# Patient Record
Sex: FEMALE | Race: WHITE | Employment: STUDENT | URBAN - METROPOLITAN AREA
[De-identification: names, ages, dates, MRNs, and addresses within clinical notes are randomized per-mention and may not be internally consistent; named-entity substitution may affect disease eponyms.]

---

## 2024-01-25 ENCOUNTER — OFFICE VISIT (OUTPATIENT)
Dept: FAMILY MEDICINE CLINIC | Facility: CLINIC | Age: 34
End: 2024-01-25
Payer: COMMERCIAL

## 2024-01-25 VITALS
HEIGHT: 65 IN | TEMPERATURE: 97 F | HEART RATE: 78 BPM | WEIGHT: 112 LBS | OXYGEN SATURATION: 98 % | SYSTOLIC BLOOD PRESSURE: 100 MMHG | RESPIRATION RATE: 16 BRPM | DIASTOLIC BLOOD PRESSURE: 70 MMHG | BODY MASS INDEX: 18.66 KG/M2

## 2024-01-25 DIAGNOSIS — L70.8 OTHER ACNE: ICD-10-CM

## 2024-01-25 DIAGNOSIS — K90.0 CELIAC DISEASE: ICD-10-CM

## 2024-01-25 DIAGNOSIS — Z91.89 AT RISK FOR WEIGHT LOSS: ICD-10-CM

## 2024-01-25 DIAGNOSIS — F41.9 ANXIETY: ICD-10-CM

## 2024-01-25 DIAGNOSIS — Z00.00 LABORATORY EXAM ORDERED AS PART OF ROUTINE GENERAL MEDICAL EXAMINATION: ICD-10-CM

## 2024-01-25 DIAGNOSIS — Z00.00 ENCOUNTER FOR MEDICAL EXAMINATION TO ESTABLISH CARE: Primary | ICD-10-CM

## 2024-01-25 DIAGNOSIS — Z97.5 IUD (INTRAUTERINE DEVICE) IN PLACE: ICD-10-CM

## 2024-01-25 PROBLEM — H93.13 TINNITUS OF BOTH EARS: Status: ACTIVE | Noted: 2020-12-21

## 2024-01-25 PROBLEM — R19.7 DIARRHEA: Status: ACTIVE | Noted: 2023-05-26

## 2024-01-25 PROBLEM — M67.471 GANGLION CYST OF RIGHT FOOT: Status: ACTIVE | Noted: 2020-12-21

## 2024-01-25 PROBLEM — R63.4 UNINTENDED WEIGHT LOSS: Status: RESOLVED | Noted: 2023-05-26 | Resolved: 2024-01-25

## 2024-01-25 PROBLEM — R63.4 UNINTENDED WEIGHT LOSS: Status: ACTIVE | Noted: 2023-05-26

## 2024-01-25 PROCEDURE — 3008F BODY MASS INDEX DOCD: CPT | Performed by: FAMILY MEDICINE

## 2024-01-25 PROCEDURE — 3078F DIAST BP <80 MM HG: CPT | Performed by: FAMILY MEDICINE

## 2024-01-25 PROCEDURE — 99204 OFFICE O/P NEW MOD 45 MIN: CPT | Performed by: FAMILY MEDICINE

## 2024-01-25 PROCEDURE — 3074F SYST BP LT 130 MM HG: CPT | Performed by: FAMILY MEDICINE

## 2024-01-25 RX ORDER — TRETINOIN 0.25 MG/G
GEL TOPICAL
Qty: 20 G | Refills: 1 | Status: SHIPPED | OUTPATIENT
Start: 2024-01-25

## 2024-01-25 NOTE — PATIENT INSTRUCTIONS
Treating Anxiety Disorders with Therapy  If you have an anxiety disorder, you should know it can be treated. Therapy (also called counseling) is often a helpful treatment for anxiety disorders. With therapy, a trained therapist helps you face and learn to manage your anxiety. Therapy can be short-term or long-term. It is based on your needs. In some cases, medicine may also be prescribed with therapy. It may take time before you notice how much therapy is helping. But stick with it. With therapy, you can feel better.  Cognitive behavioral therapy (CBT)  Cognitive behavioral therapy (CBT) teaches you to manage anxiety. It does this by helping you understand how you think and act when you’re anxious. Research has shown CBT to work very well for anxiety disorders. CBT includes homework and activities. These build your skills to cope with anxiety step by step. CBT can be done in a group or one-on-one. It often takes place for a set number of sessions. CBT has two main parts:  Cognitive therapy. This helps you identify the negative, irrational thoughts that occur with your anxiety. You’ll learn to replace these with more positive, realistic thoughts.  Behavioral therapy. This helps you change how you react to anxiety. You’ll learn coping skills and methods for relaxing to help you better deal with anxiety.    MENTAL HEALTH APPS-   [employer] CBT- Free Anxiety Focused--each you how to harness CBT to lower anxiety levels. It allows you to challenge the personal beliefs that may be holding you back from more peaceful living, and gives you clear tools to improve your overall wellbeing.    MoodKit $5.00- teaches concepts of Cognitive Behavioral Therapy    CBT Thought Diary- record and identify maladaptive thoughts.     HeadSpace- Great for Meditation can alleviate heightened anxiety, and Headspace aims to take the guesswork out of learning how to meditate, and will help you with a regular meditation practice.    Happify-  Happify is geared toward helping people lower stress and manage anxiety--all by helping you let go of habits that aren’t working for you and creating new ones

## 2024-01-25 NOTE — PROGRESS NOTES
Family Medicine Progress Note  Assessment & Plan:   Jacki Ng is a 33 year old female who is here for:     1. Encounter for medical examination to establish care  discussed PMH, PSH, Meds, Soc HX.   Last Annual: due   Prev Med:  up-to-date      2. At risk for weight loss-  prev underweight;  diet is limited due to her intolerance -and gluten.  Discussed possible benefit of seeing dietitian to come up with alternative foods that might be high-protein/high fat content and help with her goal of weight gain.  - DIETITIAN EDUCATION NON-DIABETES, DIET (INTERNAL)    3. Other acne- Acne with some scarring to the chin.  Discussed Acne Skin Care. Trial fo Tretinoin;  Discussed drying prop.   - Tretinoin 0.025 % External Gel; Apply thin layer to face every night or every other night.  Make sure to apply lotion after allowing to dry.  Dispense: 20 g; Refill: 1    4. 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  No SI/HI; GAD7 with mild anxiety.   - Discussed options. Already working o lifestyle mod.   - CBT apps discussed.   - Audiobooks/music to unwind.   - Declined Counseling at this time.      5. Celiac disease- has had some imprvement with lifestyle mod;    - Check labs for potential nutritional deficits   - DIETITIAN EDUCATION NON-DIABETES, DIET (INTERNAL)  - Vitamin B12 [E]; Future  - Folic Acid Serum [E]; Future  - Ferritin [E]; Future    6. Laboratory exam ordered as part of routine general medical examination  - CBC With Differential With Platelet; Future  - Comp Metabolic Panel (14); Future  - TSH W Reflex To Free T4; Future  - Lipid Panel; Future    7. Kyleena IUD 8/2023             Follow-Up: Return for Annual.      Mami Silva DO   01/25/24 1:58 PM     CC: Establish Care (Dx Celiac disease. )    Subjective:    History of  Present Illness:  History obtained from patient.     Jacki Ng is a 33 year old female who presents for Establish Care (Dx Celiac disease. )     Here to establish care prior PCM has since retired.  Patient was recently  in September.     MENTAL HEALTH- Feels she has been having a hard time winding down; \"resetting\" after work.  Has a very good relationship, very supportive, but she doesn't want to project stress/diffciutly with wind-down to her spouse.  Feels like she is in Constant state of fight or flight;  did have time period with more of Adjustment Dis where she was on meds, but this was after caring for a sick (now ) family member; Recently was  in 2023 and maneuvering everything w/o any immediate fmaily members was rough, which she didn't realize it would be initially. Since everything has calmed down she still feels this constant state of fight or flight;   Exercising; Sleep schedule is good;   Meds in the past-Lexapro- doesn't want to do meds now.    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     CELIAC- newer diagnosis, she had gotten to an unhealthy weight with the stress, Celiacs, Diarrhea;  Has been actively working to regain, but this is an effort given the Celiacs and Dairy intoll. \  Still with diarrhea here and there despite avoid gluten     ACNE SCARING- H/o acne and picking to the chin/face, scarring present. Not sure how to manage    DIET- healthy but has to monitor gluten/dairy.  Takes some MVN;  Ca    Kyleena IUD-23  Celiac  Anxiety/Depression  Pshx: WTE, Mole removal   All: gluten, dairy   Meds: as below  Fam hx: GBM-F, Endometrial Ca-m, Lymphoma-M   Soc hx: no tob/occ etoh/no illicits; , works in Higher Education/Grad school-Admin.   Ob/gyne: Patient's last menstrual period was 2024.. last pap: 2023, last mammogram: -,   ,   Colonoscopy: -      History/Other:   ROS-Per HPI     Problem List:  Patient Active Problem List   Diagnosis    Celiac disease    Tinnitus of both ears    Ganglion cyst of right foot    Diarrhea       Current Medications:  Current Outpatient Medications   Medication Sig Dispense Refill    Tretinoin 0.025 % External Gel Apply thin layer to face every night or every other night.  Make sure to apply lotion after allowing to dry. 20 g 1      Past Medical History:  Past Medical History:   Diagnosis Date    Anxiety     Unintended weight loss       Past Surgical History:  Past Surgical History:   Procedure Laterality Date    WISDOM TEETH REMOVED Bilateral       Family History:  Family History   Problem Relation Age of Onset    Cancer Father     Heart Disorder Father     Other (glioblastoma) Father     Musculo-skelatal Disorder Mother     Cancer Mother     Endometrial Cancer Mother     Other (lymphoma) Mother     Depression Maternal Grandmother       Social History:  Social History     Socioeconomic History    Marital status:    Tobacco Use    Smoking status: Never    Smokeless tobacco: Never   Vaping Use    Vaping Use: Never used   Substance and Sexual Activity    Alcohol use: Never    Drug use: Never    Sexual activity: Yes     Partners: Male     Birth control/protection: I.U.D.   Other Topics Concern    Caffeine Concern Yes     Comment: tea - 1-2 cups per day    Exercise Yes     Comment: 2- 4x in week- lifting and running    Seat Belt Yes       Allergies:  Allergies   Allergen Reactions    Dairy Products OTHER (SEE COMMENTS)     Digestive discomfort, subdermal swelling, joint pain    Gluten Meal NAUSEA AND VOMITING        Objective:    VITALS: /70   Pulse 78   Temp 97.3 °F (36.3 °C) (Temporal)   Resp 16   Ht 5' 5\" (1.651 m)   Wt 112 lb (50.8 kg)   LMP 2024   SpO2 98%   BMI 18.64 kg/m²      BP Readings from Last 3 Encounters:   24 100/70     Wt Readings from Last 3 Encounters:    01/25/24 112 lb (50.8 kg)         PHYSICAL EXAM  GEN: pleasant, well-appearing in NAD  SKIN: erythema and some scarring to the chin, inflam papule as well  HEENT:  no conjunctivitis or scleral icterus; mmm  PULM: breathing comfortably on room air  MSK: moving all extremities well, no weakness or joint tenderness  NEURO: CNs grossly intact, no focal weakness, alert and oriented   MENTAL STATUS EXAM  Appearance: groomed appropriately, makes good eye contact  Attitude: cooperative  Motor: No psychomotor agitation/depression   Speech: normal rate and rhythm   Mood: keyed up/on edge   Affect:  mood congruent  Form of Thought:  Fluid  Thought Content: No SI/HI   Perception: No hallucinations or delusions  Judgement and Insight- Intact      Approximately 50 minutes was spent: preparing to see the patient (reviewing prior tests, office notes, and consultant notes), personally obtaining a history, conducting a physical exam, counseling the patient on the plan of care, entering appropriate orders, and documenting clinical information in the electronic health record.         Mami Silva DO

## 2024-02-02 ENCOUNTER — OFFICE VISIT (OUTPATIENT)
Dept: FAMILY MEDICINE CLINIC | Facility: CLINIC | Age: 34
End: 2024-02-02
Payer: COMMERCIAL

## 2024-02-02 ENCOUNTER — LAB ENCOUNTER (OUTPATIENT)
Dept: LAB | Age: 34
End: 2024-02-02
Attending: FAMILY MEDICINE
Payer: COMMERCIAL

## 2024-02-02 VITALS
RESPIRATION RATE: 16 BRPM | DIASTOLIC BLOOD PRESSURE: 60 MMHG | WEIGHT: 112 LBS | SYSTOLIC BLOOD PRESSURE: 98 MMHG | TEMPERATURE: 98 F | HEART RATE: 82 BPM | BODY MASS INDEX: 18.66 KG/M2 | HEIGHT: 65 IN | OXYGEN SATURATION: 97 %

## 2024-02-02 DIAGNOSIS — K90.0 CELIAC DISEASE: ICD-10-CM

## 2024-02-02 DIAGNOSIS — Z97.5 IUD (INTRAUTERINE DEVICE) IN PLACE: ICD-10-CM

## 2024-02-02 DIAGNOSIS — L73.0 ACNE SCARRING: ICD-10-CM

## 2024-02-02 DIAGNOSIS — Z00.00 LABORATORY EXAM ORDERED AS PART OF ROUTINE GENERAL MEDICAL EXAMINATION: ICD-10-CM

## 2024-02-02 DIAGNOSIS — K59.1 FUNCTIONAL DIARRHEA: ICD-10-CM

## 2024-02-02 DIAGNOSIS — L70.0 ACNE VULGARIS: ICD-10-CM

## 2024-02-02 DIAGNOSIS — Z00.00 ENCOUNTER FOR GENERAL ADULT MEDICAL EXAMINATION WITHOUT ABNORMAL FINDINGS: Primary | ICD-10-CM

## 2024-02-02 PROBLEM — L90.5 ACNE SCARRING: Status: ACTIVE | Noted: 2024-02-02

## 2024-02-02 LAB
ALBUMIN SERPL-MCNC: 4.2 G/DL (ref 3.4–5)
ALBUMIN/GLOB SERPL: 1.4 {RATIO} (ref 1–2)
ALP LIVER SERPL-CCNC: 34 U/L
ALT SERPL-CCNC: 24 U/L
ANION GAP SERPL CALC-SCNC: 3 MMOL/L (ref 0–18)
AST SERPL-CCNC: 20 U/L (ref 15–37)
BASOPHILS # BLD AUTO: 0.05 X10(3) UL (ref 0–0.2)
BASOPHILS NFR BLD AUTO: 1.2 %
BILIRUB SERPL-MCNC: 0.6 MG/DL (ref 0.1–2)
BUN BLD-MCNC: 13 MG/DL (ref 9–23)
CALCIUM BLD-MCNC: 8.8 MG/DL (ref 8.5–10.1)
CHLORIDE SERPL-SCNC: 111 MMOL/L (ref 98–112)
CHOLEST SERPL-MCNC: 136 MG/DL (ref ?–200)
CO2 SERPL-SCNC: 27 MMOL/L (ref 21–32)
CREAT BLD-MCNC: 0.59 MG/DL
DEPRECATED HBV CORE AB SER IA-ACNC: 9.6 NG/ML
EGFRCR SERPLBLD CKD-EPI 2021: 120 ML/MIN/1.73M2 (ref 60–?)
EOSINOPHIL # BLD AUTO: 0.07 X10(3) UL (ref 0–0.7)
EOSINOPHIL NFR BLD AUTO: 1.6 %
ERYTHROCYTE [DISTWIDTH] IN BLOOD BY AUTOMATED COUNT: 13.7 %
FASTING PATIENT LIPID ANSWER: YES
FASTING STATUS PATIENT QL REPORTED: YES
FOLATE SERPL-MCNC: 21.4 NG/ML (ref 8.7–?)
GLOBULIN PLAS-MCNC: 3 G/DL (ref 2.8–4.4)
GLUCOSE BLD-MCNC: 91 MG/DL (ref 70–99)
HCT VFR BLD AUTO: 43.6 %
HDLC SERPL-MCNC: 75 MG/DL (ref 40–59)
HGB BLD-MCNC: 14.1 G/DL
IMM GRANULOCYTES # BLD AUTO: 0.01 X10(3) UL (ref 0–1)
IMM GRANULOCYTES NFR BLD: 0.2 %
LDLC SERPL CALC-MCNC: 53 MG/DL (ref ?–100)
LYMPHOCYTES # BLD AUTO: 1.46 X10(3) UL (ref 1–4)
LYMPHOCYTES NFR BLD AUTO: 33.7 %
MCH RBC QN AUTO: 31.1 PG (ref 26–34)
MCHC RBC AUTO-ENTMCNC: 32.3 G/DL (ref 31–37)
MCV RBC AUTO: 96 FL
MONOCYTES # BLD AUTO: 0.44 X10(3) UL (ref 0.1–1)
MONOCYTES NFR BLD AUTO: 10.2 %
NEUTROPHILS # BLD AUTO: 2.3 X10 (3) UL (ref 1.5–7.7)
NEUTROPHILS # BLD AUTO: 2.3 X10(3) UL (ref 1.5–7.7)
NEUTROPHILS NFR BLD AUTO: 53.1 %
NONHDLC SERPL-MCNC: 61 MG/DL (ref ?–130)
OSMOLALITY SERPL CALC.SUM OF ELEC: 292 MOSM/KG (ref 275–295)
PLATELET # BLD AUTO: 226 10(3)UL (ref 150–450)
POTASSIUM SERPL-SCNC: 4.4 MMOL/L (ref 3.5–5.1)
PROT SERPL-MCNC: 7.2 G/DL (ref 6.4–8.2)
RBC # BLD AUTO: 4.54 X10(6)UL
SODIUM SERPL-SCNC: 141 MMOL/L (ref 136–145)
TRIGL SERPL-MCNC: 25 MG/DL (ref 30–149)
TSI SER-ACNC: 1.54 MIU/ML (ref 0.36–3.74)
VIT B12 SERPL-MCNC: 398 PG/ML (ref 193–986)
VLDLC SERPL CALC-MCNC: 4 MG/DL (ref 0–30)
WBC # BLD AUTO: 4.3 X10(3) UL (ref 4–11)

## 2024-02-02 PROCEDURE — 85025 COMPLETE CBC W/AUTO DIFF WBC: CPT

## 2024-02-02 PROCEDURE — 82607 VITAMIN B-12: CPT

## 2024-02-02 PROCEDURE — 80053 COMPREHEN METABOLIC PANEL: CPT

## 2024-02-02 PROCEDURE — 3008F BODY MASS INDEX DOCD: CPT | Performed by: FAMILY MEDICINE

## 2024-02-02 PROCEDURE — 36415 COLL VENOUS BLD VENIPUNCTURE: CPT

## 2024-02-02 PROCEDURE — 82728 ASSAY OF FERRITIN: CPT

## 2024-02-02 PROCEDURE — 84443 ASSAY THYROID STIM HORMONE: CPT

## 2024-02-02 PROCEDURE — 3078F DIAST BP <80 MM HG: CPT | Performed by: FAMILY MEDICINE

## 2024-02-02 PROCEDURE — 82746 ASSAY OF FOLIC ACID SERUM: CPT

## 2024-02-02 PROCEDURE — 80061 LIPID PANEL: CPT

## 2024-02-02 PROCEDURE — 3074F SYST BP LT 130 MM HG: CPT | Performed by: FAMILY MEDICINE

## 2024-02-02 PROCEDURE — 99395 PREV VISIT EST AGE 18-39: CPT | Performed by: FAMILY MEDICINE

## 2024-02-02 RX ORDER — ASCORBIC ACID 500 MG
500 TABLET ORAL DAILY
COMMUNITY

## 2024-02-02 RX ORDER — MELATONIN
1000 DAILY
COMMUNITY

## 2024-02-02 RX ORDER — MULTIVITAMIN
1 TABLET ORAL DAILY
COMMUNITY

## 2024-02-02 RX ORDER — ASCORBIC ACID 500 MG
500 TABLET ORAL DAILY
COMMUNITY
End: 2024-02-02 | Stop reason: ALTCHOICE

## 2024-02-02 RX ORDER — CALCIUM CARBONATE 500 MG/1
1 TABLET, CHEWABLE ORAL DAILY
COMMUNITY

## 2024-02-02 NOTE — PROGRESS NOTES
Family Medicine Progress Note  Assessment & Plan:   Jacki Ng is a 33 year old female who is here for:       1. Encounter for general adult medical examination without abnormal findings  Wellness Exam done today and routine Preventative Care discussed as noted below.   -Pap smear: 01/20/2023 - none   -G&C testing: declined  -Contraception:  IUD   -Immunizations:  up-to-date   -Routine labs ordered.   -Healthy eating habits and regular exercise encouraged     2. Acne vulgaris  3. Acne scarring- pt was interested in [place] for acne scarring; discussed Derm and possible lase/microdermabrasion, referral as below   - DERM - EXTERNAL    4. Kyleena IUD 8/2023-    5. Celiac disease- chronic, actively managing.  Nutritional labs pending.      6. Functional diarrhea- still with AM diarrhea; discussed trying Metamucil/Fiber.          Follow-Up: Return for as needed.      Mami Silva DO        CC: Physical    Subjective:    History of Present Illness:  History obtained from patient.     Jacki Ng is a 33 year old female who presents for Physical     ANNUAL PHYSICAL  Menses: regular but still very light.   LMP: Patient's last menstrual period was 01/17/2024.  Concern for STI: Denies   Contraception:  IUD   Cervical Cancer Fhwypiobr-se-gz-date    PMH of Abnormal Pap: denies   Exercise: generally tries to be active--- Lifting 2/wk and some cardio.   Healthy eating habits:  healthy but has to monitor gluten/dairy.  Takes some MVN;  Ca  Tobacco: Denies   Immunizations: up-to-date        CELIAC- newer diagnosis, she had gotten to an unhealthy weight with the stress, Celiacs, Diarrhea;  Has been actively working to regain, but this is an effort given the Celiacs and Dairy intoll. Still with diarrhea here and there despite avoid gluten   ACNE SCARING- H/o acne and picking to the chin/face, scarring present. Not sure how to manage  Kyleena IUD-8/7/23  Anxiety/Depression-self managing   Pshx: WTE, Mole removal   All:  gluten, dairy   Meds: as below  Fam hx: GBM-F, Endometrial Ca-m, Lymphoma-M   Soc hx: no tob/occ etoh/no illicits; , works in Higher Education/Grad school-Admin.   Ob/gyne: Patient's last menstrual period was 2024.. last pap: 2023, last mammogram: -,  ,   Colonoscopy: -      History/Other:   ROS-Per HPI     Problem List:  Patient Active Problem List   Diagnosis    Celiac disease    Tinnitus of both ears    Ganglion cyst of right foot    Diarrhea    Kyleena IUD 2023    Acne scarring       Current Medications:  Current Outpatient Medications   Medication Sig Dispense Refill    cholecalciferol 25 MCG (1000 UT) Oral Tab Take 1 tablet (1,000 Units total) by mouth daily.      Multiple Vitamin (ONE-DAILY MULTI VITAMINS) Oral Tab Take 1 tablet by mouth daily.      Vitamin C 500 MG Oral Tab Take 1 tablet (500 mg total) by mouth daily.      calcium carbonate 500 MG Oral Chew Tab Chew 1 tablet (500 mg total) by mouth daily.      Tretinoin 0.025 % External Gel Apply thin layer to face every night or every other night.  Make sure to apply lotion after allowing to dry. (Patient not taking: Reported on 2024) 20 g 1      Past Medical History:  Past Medical History:   Diagnosis Date    Anxiety     Unintended weight loss       Past Surgical History:  Past Surgical History:   Procedure Laterality Date    WISDOM TEETH REMOVED Bilateral       Family History:  Family History   Problem Relation Age of Onset    Cancer Father     Heart Disorder Father     Other (glioblastoma) Father     Musculo-skelatal Disorder Mother     Cancer Mother     Endometrial Cancer Mother     Other (lymphoma) Mother     Depression Maternal Grandmother       Social History:  Social History     Socioeconomic History    Marital status:    Tobacco Use    Smoking status: Never    Smokeless tobacco: Never   Vaping Use    Vaping Use: Never used   Substance and Sexual Activity    Alcohol use: Never    Drug use: Never    Sexual  activity: Yes     Partners: Male     Birth control/protection: I.U.D.   Other Topics Concern    Caffeine Concern Yes     Comment: tea - 1-2 cups per day    Exercise Yes     Comment: 2- 4x in week- lifting and running    Seat Belt Yes       Allergies:  Allergies   Allergen Reactions    Dairy Products OTHER (SEE COMMENTS)     Digestive discomfort, subdermal swelling, joint pain    Gluten Meal NAUSEA AND VOMITING        Objective:    VITALS: BP 98/60   Pulse 82   Temp 97.8 °F (36.6 °C) (Temporal)   Resp 16   Ht 5' 5\" (1.651 m)   Wt 112 lb (50.8 kg)   LMP 01/17/2024   SpO2 97%   BMI 18.64 kg/m²      BP Readings from Last 3 Encounters:   02/02/24 98/60   01/25/24 100/70     Wt Readings from Last 3 Encounters:   02/02/24 112 lb (50.8 kg)   01/25/24 112 lb (50.8 kg)         PHYSICAL EXAM  GEN: pleasant, well-appearing in NAD, AOX3  SKIN: inflammatory papules and scarring to the face.   HEENT: PERRL, EOMI, moist mucous membranes, oropharynx clear, TM clear, nares patent, no Thyromegaly or nodule.   CV: RRR, no murmurs or abnl heart sounds   PULM: Clear to auscultation, No wheezes, rales, rhonchi.  Non-labored breathing.  ABD: Soft, non-tender, non-distended, + BS, no rigidity/guarding  EXT:  Warm, well perfused, no lower extremity edema  NEURO: CNs grossly intact, no focal weakness  MSK: moves all 4 extremities without difficulty  PSYCH: mood and affect are appropriate       Mami Silva DO

## 2024-02-02 NOTE — PATIENT INSTRUCTIONS
Screening Guidelines, Women Ages 18 to 39  Screening tests and health counseling are an important part of managing your health. A screening test is done to find possible disorders or diseases in people who don't have any symptoms. The goal is to find a disease early so changes can be made and you can be watched more closely to reduce the risk of disease, or to detect it early enough to treat it most effectively. Screening tests but are used to determine if more testing is needed. Health counseling is essential, too. Below are guidelines for these, for women ages 18 to 39. Talk with your healthcare provider to make sure you’re up-to-date on what you need.  We understand gender is a spectrum. We may use gendered terms to talk about anatomy and health risk. Please use this sheet in a way that works best for you and your provider as you talk about your care.  Screening Who needs it How often   Alcohol misuse All women in this age group At routine exams   Blood pressure All women in this age group Yearly checkup if your blood pressure is normal  Normal blood pressure is less than 120/80 mm Hg  If your blood pressure reading is higher than normal, follow the advice of your healthcare provider   Breast cancer All women in this age group should talk with their healthcare providers about the need for clinical breast exams (CBE)1 Clinical breast exam every 3 years   Cervical cancer Women ages 21 and older Women between ages 21 and 29 should have a Pap test every 3 years; women between ages 30 and 65 are advised to have a Pap test plus an HPV test every 5 years   Chlamydia Sexually active women, including those who are pregnant and who are:  24 years and younger  25 years and older at increased risk for infection    At routine yearly exams  If pregnant, during early prenatal care visit. Repeat in 3rd trimester for women at increased risk.   Depression All women in this age group At routine exams   Diabetes mellitus, type 2  Adults with no symptoms who are overweight or obese and have 1 or more other risk factors for diabetes At least every 3 years. Also, testing for diabetes during pregnancy after the 24th week.    Gonorrhea Sexually active women, including those who are pregnant and who are:  24 years and younger  25 years and older at increased risk for infection    At routine yearly exams  Test during pregnancy if 25 years or younger or if living in an area where gonorrhea is common.   Hepatitis C Anyone at increased risk At routine exams   HIV All women At routine exams  and in all pregnant people   Obesity All women in this age group At routine exams   Syphilis Women at increased risk for infection should talk with their healthcare provider At routine exams   Tuberculosis Women at increased risk for infection should talk with their healthcare provider Ask your healthcare provider   Vision All women in this age group At least 1 complete exam in your 20s, and 2 in your 30s   Health counseling Who needs it How often   BRCA gene mutation testing for breast and ovarian cancer susceptibility Women with increased risk for having gene mutation When your risk is known   Breast cancer and chemoprevention Women at high risk for breast cancer When your risk is known   Diet and exercise Women who are overweight or obese When diagnosed, and then at routine exams   Domestic violence All women in this age group Every visit   Sexually transmitted infection prevention Women who are sexually active At routine exams   Skin cancer Prevention of skin cancer in fair-skinned adults At routine exams   Use of tobacco and the health effects it can cause All women in this age group Every visit   According to the ACS, women ages 20 to 39 years should have a clinical breast exam (CBE) as part of their routine health exam every 3 years. Breast self-exams are an option for women starting in their 20s. But the U.S. Preventive Services Task Force (USPSTF) does not  recommend CBE.  Those who are 18 years old and not up-to-date on their childhood vaccines should get all appropriate catch-up vaccines recommended by the CDC.  The USPSTF recommends that all people ages 15 to 65 years be screened for HIV and those younger or older people at increased risk. The CDC recommends that everyone between the ages of 13 and 64 get tested for HIV at least once as part of routine health care.  [employer] last reviewed this educational content on 6/1/2021 © 2000-2022 [employer]. All rights reserved. This information is not intended as a substitute for professional medical care. Always follow your healthcare professional's instructions.

## 2025-03-12 ENCOUNTER — LAB ENCOUNTER (OUTPATIENT)
Dept: LAB | Age: 35
End: 2025-03-12
Attending: FAMILY MEDICINE
Payer: COMMERCIAL

## 2025-03-12 ENCOUNTER — OFFICE VISIT (OUTPATIENT)
Dept: FAMILY MEDICINE CLINIC | Facility: CLINIC | Age: 35
End: 2025-03-12
Payer: COMMERCIAL

## 2025-03-12 VITALS
HEIGHT: 65 IN | DIASTOLIC BLOOD PRESSURE: 70 MMHG | TEMPERATURE: 99 F | RESPIRATION RATE: 16 BRPM | BODY MASS INDEX: 19.83 KG/M2 | SYSTOLIC BLOOD PRESSURE: 96 MMHG | OXYGEN SATURATION: 98 % | HEART RATE: 90 BPM | WEIGHT: 119 LBS

## 2025-03-12 DIAGNOSIS — Z30.432 ENCOUNTER FOR IUD REMOVAL: ICD-10-CM

## 2025-03-12 DIAGNOSIS — Z31.69 PRE-CONCEPTION COUNSELING: ICD-10-CM

## 2025-03-12 DIAGNOSIS — K90.0 CELIAC DISEASE (HCC): ICD-10-CM

## 2025-03-12 DIAGNOSIS — Z00.00 LABORATORY EXAM ORDERED AS PART OF ROUTINE GENERAL MEDICAL EXAMINATION: ICD-10-CM

## 2025-03-12 DIAGNOSIS — Z00.01 ENCOUNTER FOR GENERAL ADULT MEDICAL EXAMINATION WITH ABNORMAL FINDINGS: Primary | ICD-10-CM

## 2025-03-12 LAB
ALBUMIN SERPL-MCNC: 4.6 G/DL (ref 3.2–4.8)
ALBUMIN/GLOB SERPL: 1.7 {RATIO} (ref 1–2)
ALP LIVER SERPL-CCNC: 35 U/L
ALT SERPL-CCNC: 16 U/L
ANION GAP SERPL CALC-SCNC: 8 MMOL/L (ref 0–18)
AST SERPL-CCNC: 26 U/L (ref ?–34)
BASOPHILS # BLD AUTO: 0.01 X10(3) UL (ref 0–0.2)
BASOPHILS NFR BLD AUTO: 0.2 %
BILIRUB SERPL-MCNC: 0.5 MG/DL (ref 0.3–1.2)
BUN BLD-MCNC: 11 MG/DL (ref 9–23)
CALCIUM BLD-MCNC: 8.9 MG/DL (ref 8.7–10.6)
CHLORIDE SERPL-SCNC: 102 MMOL/L (ref 98–112)
CHOLEST SERPL-MCNC: 125 MG/DL (ref ?–200)
CO2 SERPL-SCNC: 24 MMOL/L (ref 21–32)
CREAT BLD-MCNC: 0.77 MG/DL
DEPRECATED HBV CORE AB SER IA-ACNC: 51 NG/ML
EGFRCR SERPLBLD CKD-EPI 2021: 102 ML/MIN/1.73M2 (ref 60–?)
EOSINOPHIL # BLD AUTO: 0 X10(3) UL (ref 0–0.7)
EOSINOPHIL NFR BLD AUTO: 0 %
ERYTHROCYTE [DISTWIDTH] IN BLOOD BY AUTOMATED COUNT: 13.3 %
FASTING PATIENT LIPID ANSWER: YES
FASTING STATUS PATIENT QL REPORTED: YES
FOLATE SERPL-MCNC: 36.2 NG/ML (ref 5.4–?)
GLOBULIN PLAS-MCNC: 2.7 G/DL (ref 2–3.5)
GLUCOSE BLD-MCNC: 93 MG/DL (ref 70–99)
HCT VFR BLD AUTO: 39.7 %
HDLC SERPL-MCNC: 58 MG/DL (ref 40–59)
HGB BLD-MCNC: 13.3 G/DL
IMM GRANULOCYTES # BLD AUTO: 0.01 X10(3) UL (ref 0–1)
IMM GRANULOCYTES NFR BLD: 0.2 %
LDLC SERPL CALC-MCNC: 57 MG/DL (ref ?–100)
LYMPHOCYTES # BLD AUTO: 0.31 X10(3) UL (ref 1–4)
LYMPHOCYTES NFR BLD AUTO: 6.4 %
MCH RBC QN AUTO: 31.5 PG (ref 26–34)
MCHC RBC AUTO-ENTMCNC: 33.5 G/DL (ref 31–37)
MCV RBC AUTO: 94.1 FL
MONOCYTES # BLD AUTO: 0.5 X10(3) UL (ref 0.1–1)
MONOCYTES NFR BLD AUTO: 10.3 %
NEUTROPHILS # BLD AUTO: 4.04 X10 (3) UL (ref 1.5–7.7)
NEUTROPHILS # BLD AUTO: 4.04 X10(3) UL (ref 1.5–7.7)
NEUTROPHILS NFR BLD AUTO: 82.9 %
NONHDLC SERPL-MCNC: 67 MG/DL (ref ?–130)
OSMOLALITY SERPL CALC.SUM OF ELEC: 277 MOSM/KG (ref 275–295)
PLATELET # BLD AUTO: 183 10(3)UL (ref 150–450)
POTASSIUM SERPL-SCNC: 3.5 MMOL/L (ref 3.5–5.1)
PROT SERPL-MCNC: 7.3 G/DL (ref 5.7–8.2)
RBC # BLD AUTO: 4.22 X10(6)UL
SODIUM SERPL-SCNC: 134 MMOL/L (ref 136–145)
T3FREE SERPL-MCNC: 2.29 PG/ML (ref 2.4–4.2)
T4 FREE SERPL-MCNC: 1.1 NG/DL (ref 0.8–1.7)
TRIGL SERPL-MCNC: 41 MG/DL (ref 30–149)
TSI SER-ACNC: 0.41 UIU/ML (ref 0.55–4.78)
VIT B12 SERPL-MCNC: 448 PG/ML (ref 211–911)
VIT D+METAB SERPL-MCNC: 44.8 NG/ML (ref 30–100)
VLDLC SERPL CALC-MCNC: 6 MG/DL (ref 0–30)
WBC # BLD AUTO: 4.9 X10(3) UL (ref 4–11)

## 2025-03-12 PROCEDURE — 82306 VITAMIN D 25 HYDROXY: CPT

## 2025-03-12 PROCEDURE — 80053 COMPREHEN METABOLIC PANEL: CPT

## 2025-03-12 PROCEDURE — 84443 ASSAY THYROID STIM HORMONE: CPT

## 2025-03-12 PROCEDURE — 82728 ASSAY OF FERRITIN: CPT

## 2025-03-12 PROCEDURE — 84481 FREE ASSAY (FT-3): CPT

## 2025-03-12 PROCEDURE — 82607 VITAMIN B-12: CPT

## 2025-03-12 PROCEDURE — 36415 COLL VENOUS BLD VENIPUNCTURE: CPT

## 2025-03-12 PROCEDURE — 85025 COMPLETE CBC W/AUTO DIFF WBC: CPT

## 2025-03-12 PROCEDURE — 82746 ASSAY OF FOLIC ACID SERUM: CPT

## 2025-03-12 PROCEDURE — 80061 LIPID PANEL: CPT

## 2025-03-12 PROCEDURE — 84439 ASSAY OF FREE THYROXINE: CPT

## 2025-03-12 NOTE — PROGRESS NOTES
Family Medicine Progress Note  Assessment & Plan:   Follow-Up: Return for as needed.     Assessment & Plan  Encounter for general adult medical examination with abnormal findings  Wellness Exam done today and routine Preventative Care discussed as noted below.   -Pap smear: 01/20/2023   up-to-date    -G&C testing: declined  -Contraception:  none TTC  -Immunizations:  up-to-date   -Routine labs ordered.   -Healthy eating habits and regular exercise encouraged   Orders:    CBC With Differential With Platelet; Future; Expected date: 03/12/2025    Comp Metabolic Panel (14); Future; Expected date: 03/12/2025    TSH W Reflex To Free T4; Future; Expected date: 03/12/2025    Lipid Panel; Future; Expected date: 03/12/2025    Celiac disease (HCC)  Actively managing;  eval for nutritional def.    Orders:    Ferritin; Future; Expected date: 03/12/2025    Vitamin B12; Future; Expected date: 03/12/2025    Folic Acid Serum (Folate); Future; Expected date: 03/12/2025    Vitamin D; Future; Expected date: 03/12/2025    Pre-conception counseling  -  34 year old YOF with hopes of becoming pregnant in the next 6 mos.    - Recommended PNV   - Discussed 3 mos interval menses to regulate after d/c of contraception  - BMI is appropriate   - Discussed routine exercise and healthy eating.   - Discussed ideal timing for intercourse,   - No tobacco and alcohol use.  Feels safe at home.    - Not on any teratogenic RX therapy        Encounter for IUD removal            Subjective:    CC: Physical and IUD (Would like to talk about removal of IUD. )  History of Present Illness:  History obtained from patient.     Jacki Ng is a 34 year old female who presents for Physical and IUD (Would like to talk about removal of IUD. )     ANNUAL PHYSICAL  Menses: regular but still very light.   LMP: Patient's last menstrual period was 02/20/2025.  Concern for STI: Denies   Contraception:  IUD - but would like to have removed, they are ready to start  trying!   Cervical Cancer [id number]-na-ok-date    PMH of Abnormal Pap: denies   Exercise: generally tries to be active--- Lifting 2/wk and some cardio.   Healthy eating habits:  healthy but has to monitor gluten/dairy.  Takes some MVN;  Ca  Tobacco: Denies   Immunizations: up-to-date        CELIAC- avoids Gluten   ACNE SCARING- H/o acne and picking to the chin/face, scarring present. Not sure how to manag  Anxiety/Depression-self managing   Pshx: WTE, Mole removal   All: gluten, dairy   Meds: as below  Fam hx: GBM-F, Endometrial Ca-m, Lymphoma-M   Soc hx: no tob/occ etoh/no illicits; , works in Higher Education/Grad school-Admin.   Ob/gyne: Patient's last menstrual period was 2025.. last pap: 2023, last mammogram: -,  ,   Colonoscopy: -      History/Other:   ROS-Per HPI     Problem List:  Patient Active Problem List   Diagnosis    Celiac disease (HCC)    Tinnitus of both ears    Ganglion cyst of right foot    Diarrhea    Kyleena IUD 2023    Acne scarring       Current Medications:  Current Outpatient Medications   Medication Sig Dispense Refill    Multiple Vitamin (ONE-DAILY MULTI VITAMINS) Oral Tab Take 1 tablet by mouth daily.      calcium carbonate 500 MG Oral Chew Tab Chew 1 tablet (500 mg total) by mouth daily.      cholecalciferol 25 MCG (1000 UT) Oral Tab Take 1 tablet (1,000 Units total) by mouth daily. (Patient not taking: Reported on 3/12/2025)      Vitamin C 500 MG Oral Tab Take 1 tablet (500 mg total) by mouth daily. (Patient not taking: Reported on 3/12/2025)      Tretinoin 0.025 % External Gel Apply thin layer to face every night or every other night.  Make sure to apply lotion after allowing to dry. (Patient not taking: Reported on 2024) 20 g 1      Past Medical History:  Past Medical History:    Anxiety    Unintended weight loss      Past Surgical History:  Past Surgical History:   Procedure Laterality Date    Other surgical history  wisdom teeth, benign mole    [place]  teeth removed Bilateral       Family History:  Family History   Problem Relation Age of Onset    Cancer Father         glioblastoma    Heart Disorder Father     Other (glioblastoma) Father     Musculo-skelatal Disorder Mother     Cancer Mother         endometrial and a lymphoma    Endometrial Cancer Mother     Other (lymphoma) Mother     Depression Maternal Grandmother     Stroke Maternal Grandmother       Social History:  Social History     Socioeconomic History    Marital status:    Tobacco Use    Smoking status: Never    Smokeless tobacco: Never   Vaping Use    Vaping status: Never Used   Substance and Sexual Activity    Alcohol use: Never    Drug use: Never    Sexual activity: Yes     Partners: Male     Birth control/protection: I.U.D.   Other Topics Concern    Caffeine Concern Yes    Special Diet Yes     Comment: celiac positive (fully gluten free)    Exercise Yes    Seat Belt Yes     Social Drivers of Health     Food Insecurity: No Food Insecurity (3/12/2025)    NCSS - Food Insecurity     Worried About Running Out of Food in the Last Year: No     Ran Out of Food in the Last Year: No   Transportation Needs: No Transportation Needs (3/12/2025)    NCSS - Transportation     Lack of Transportation: No   Housing Stability: Not At Risk (3/12/2025)    NCSS - Housing/Utilities     Has Housing: Yes     Worried About Losing Housing: No     Unable to Get Utilities: No       Allergies:  Allergies   Allergen Reactions    Dairy Products OTHER (SEE COMMENTS)     Digestive discomfort, subdermal swelling, joint pain    Gluten Meal NAUSEA AND VOMITING        Objective:    VITALS: BP 96/70   Pulse 90   Temp 98.9 °F (37.2 °C) (Temporal)   Resp 16   Ht 5' 5\" (1.651 m)   Wt 119 lb (54 kg)   LMP 02/20/2025   SpO2 98%   BMI 19.80 kg/m²      BP Readings from Last 3 Encounters:   03/12/25 96/70   02/02/24 98/60   01/25/24 100/70     Wt Readings from Last 3 Encounters:   03/12/25 119 lb (54 kg)   02/02/24 112 lb (50.8 kg)    01/25/24 112 lb (50.8 kg)         PHYSICAL EXAM  GEN: pleasant, well-appearing in NAD, AOX3  SKIN: inflammatory papules and scarring to the face.   HEENT: PERRL, EOMI, moist mucous membranes, oropharynx clear, TM clear, nares patent, no Thyromegaly or nodule.   CV: RRR, no murmurs or abnl heart sounds   PULM: Clear to auscultation, No wheezes, rales, rhonchi.  Non-labored breathing.  ABD: Soft, non-tender, non-distended, + BS, no rigidity/guarding  EXT:  Warm, well perfused, no lower extremity edema  NEURO: CNs grossly intact, no focal weakness  MSK: moves all 4 extremities without difficulty  PSYCH: mood and affect are appropriate  BREAST: clinical breast exam done,  Non-tender, no palpable lumps,  No skin changes, no nipple discharge.    PELVIC EXAM: Chaperone offered and declined.   Vulva: no masses, tenderness or lesions,   Vagina: normal mucosa and rugae, no lesions  Cervix: IUD strings visualized ;  physiologic discharge, no cervical lesions or presence of irritation/inflammation      Family Medicine Procedure Note- IUD Removal   PRE-OP DIAGNOSIS: IUD   POST-OP DIAGNOSIS: IUD Removed  PROCEDURE: IUD Removal   PHYSICIAN: Mami Silva DO   ASSISTANT(S): none   FINDINGS: IUD strings in place   ANESTHESIA: None   INDICATION: No longer needed   DESCRIPTION:  Before the procedure, we discussed the nature of the procedure along with the risks  benefits, and alternatives. The patient carefully considered all of this information and chose to proceed with IUD removal . All of her questions were answered.  Verbal consent  A safety pause was performed to verify correct patient, procedure, equipment, support staff and procedural site.   The speculum was inserted and the cervix was identified.  The IUD strings were identified and grasped with a Laila clamp and the IUD was removed.  The patient tolerated the procedure well.  No complications.  ESTMATED BLOOD LOSS:<5ml  COMPLICATIONS: None   CONDITION: Good    DISPOSITION: Jesus Silva DO    03/12/25 3:22 PM    NOTE TO PATIENT: The 21st Century Cures Act makes clinical notes like these available to patients in the interest of transparency. Clinical notes are medical documents used by physicians and care providers to communicate with each other. These documents include medical language and terminology, abbreviations, and treatment information that may sound technical and at times possibly unfamiliar. In addition, at times, the verbiage may appear blunt or direct. These documents are one tool providers use to communicate relevant information and clinical opinions of the care providers in a way that allows common understanding of the clinical context.

## 2025-03-12 NOTE — ASSESSMENT & PLAN NOTE
Actively managing;  eval for nutritional def.    Orders:    Ferritin; Future; Expected date: 03/12/2025    Vitamin B12; Future; Expected date: 03/12/2025    Folic Acid Serum (Folate); Future; Expected date: 03/12/2025    Vitamin D; Future; Expected date: 03/12/2025

## 2025-03-12 NOTE — PROGRESS NOTES
The following individual(s) verbally consented to be NOT recorded using ambient AI listening technology .    Patient name: Jacki Ng   Additional names:

## 2025-03-21 DIAGNOSIS — R94.6 ABNORMAL THYROID FUNCTION TEST: Primary | ICD-10-CM

## 2025-04-23 ENCOUNTER — LABORATORY ENCOUNTER (OUTPATIENT)
Dept: LAB | Age: 35
End: 2025-04-23
Attending: FAMILY MEDICINE
Payer: COMMERCIAL

## 2025-04-23 DIAGNOSIS — R94.6 ABNORMAL THYROID FUNCTION TEST: ICD-10-CM

## 2025-04-23 LAB
T3FREE SERPL-MCNC: 3.41 PG/ML (ref 2.4–4.2)
T4 FREE SERPL-MCNC: 1.2 NG/DL (ref 0.8–1.7)
THYROGLOB SERPL-MCNC: 26 U/ML (ref ?–60)
THYROPEROXIDASE AB SERPL-ACNC: <28 U/ML (ref ?–60)
TSI SER-ACNC: 1.42 UIU/ML (ref 0.55–4.78)

## 2025-04-23 PROCEDURE — 86376 MICROSOMAL ANTIBODY EACH: CPT

## 2025-04-23 PROCEDURE — 86800 THYROGLOBULIN ANTIBODY: CPT

## 2025-04-23 PROCEDURE — 84445 ASSAY OF TSI GLOBULIN: CPT

## 2025-04-23 PROCEDURE — 36415 COLL VENOUS BLD VENIPUNCTURE: CPT

## 2025-04-23 PROCEDURE — 84439 ASSAY OF FREE THYROXINE: CPT

## 2025-04-23 PROCEDURE — 84481 FREE ASSAY (FT-3): CPT

## 2025-04-23 PROCEDURE — 84443 ASSAY THYROID STIM HORMONE: CPT

## 2025-04-25 LAB — THY STIM IMMUNO: <0.1 IU/L

## 2025-06-16 ENCOUNTER — OFFICE VISIT (OUTPATIENT)
Dept: FAMILY MEDICINE CLINIC | Facility: CLINIC | Age: 35
End: 2025-06-16
Payer: COMMERCIAL

## 2025-06-16 VITALS
BODY MASS INDEX: 20.83 KG/M2 | HEART RATE: 84 BPM | HEIGHT: 65 IN | TEMPERATURE: 97 F | WEIGHT: 125 LBS | OXYGEN SATURATION: 98 % | RESPIRATION RATE: 18 BRPM | DIASTOLIC BLOOD PRESSURE: 70 MMHG | SYSTOLIC BLOOD PRESSURE: 110 MMHG

## 2025-06-16 DIAGNOSIS — Z3A.01 LESS THAN 8 WEEKS GESTATION OF PREGNANCY (HCC): Primary | ICD-10-CM

## 2025-06-16 PROBLEM — Z97.5 IUD (INTRAUTERINE DEVICE) IN PLACE: Status: RESOLVED | Noted: 2024-02-02 | Resolved: 2025-06-16

## 2025-06-16 RX ORDER — CHOLECALCIFEROL (VITAMIN D3) 25 MCG
1 TABLET,CHEWABLE ORAL DAILY
COMMUNITY

## 2025-06-16 NOTE — PROGRESS NOTES
Family Medicine Progress Note  Assessment & Plan:   Follow-Up: Return for as needed.        Assessment & Plan  Less than 8 weeks gestation of pregnancy (HCC)  Pt is a  presenting with recent positive home UPT, Patient's last menstrual period was 05/10/2025 (exact date). Based on LMP she would be about 5w+2d with RIYA .    - Congratulated her!   - Discussed 1st trimeser expectations.   - Discussed foods/Meds to avoid.   - Engage in regular exercise, adjusting as needed.  - Midwife referral placed. As requested.   - Check insurance for chiropractic care coverage.  Orders:    Midwife Referral - Francy ([place])       Subjective:    CC: Follow - Up  History of Present Illness:  History obtained from patient.     Jacki Ng is a 34 year old female who presents for Follow - Up     History of Present Illness  Jacki Ng is a 34 year old female who presents for a prenatal visit.    She is attending her first prenatal visit after confirming her pregnancy. Her last menstrual cycle was on May 10, 2025, and she has been taking prenatal vitamins since 2025. She experiences some insomnia and increased fatigue but otherwise feels well. No nausea or food aversions have been noted.    She inquires about dietary restrictions during pregnancy, specifically regarding herbal teas, lunch meats, and caffeine. She discusses her exercise routine, which includes rollerblading, and is considering adjustments due to balance concerns as her pregnancy progresses.    She expresses interest in midwifery care and is exploring options for a midwife. She is considering a chiropractor for potential back pain as her pregnancy progresses.    She asks about lifestyle considerations during pregnancy, including the safety of household painting and sex during pregnancy.         CELIAC- avoids Gluten   ACNE SCARING- H/o acne and picking to the chin/face, scarring present. Not sure how to manag  Anxiety/Depression-self managing    Pshx: WTE, Mole removal   All: gluten, dairy   Meds: as below  Fam hx: GBM-F, Endometrial Ca-m, Lymphoma-M   Soc hx:  , works in Higher Education/Grad school-Admin.   Ob/gyne: Patient's last menstrual period was 05/10/2025 (exact date).. last pap: 2023, last mammogram: -,  ,   Colonoscopy: -      History/Other:   ROS-Per HPI     Problem List:  Patient Active Problem List   Diagnosis    Celiac disease (HCC)    Tinnitus of both ears    Ganglion cyst of right foot    Diarrhea    Acne scarring       Current Medications:  Current Outpatient Medications   Medication Sig Dispense Refill    prenatal vitamin with DHA 27-0.8-228 MG Oral Cap Take 1 capsule by mouth daily.      calcium carbonate 500 MG Oral Chew Tab Chew 1 tablet (500 mg total) by mouth daily.        Past Medical History:  Past Medical History:    Anxiety    Kyleena IUD 2023    Unintended weight loss      Past Surgical History:  Past Surgical History:   Procedure Laterality Date    Other surgical history  wisdom teeth, benign mole    [place] teeth removed Bilateral       Family History:  Family History   Problem Relation Age of Onset    Cancer Father         glioblastoma    Heart Disorder Father     Other (glioblastoma) Father     Musculo-skelatal Disorder Mother     Cancer Mother         endometrial and a lymphoma    Endometrial Cancer Mother     Other (lymphoma) Mother     Depression Maternal Grandmother     Stroke Maternal Grandmother       Social History:  Short Social Hx on File[1]      Allergies:  Allergies   Allergen Reactions    Dairy Products OTHER (SEE COMMENTS)     Digestive discomfort, subdermal swelling, joint pain    Gluten Meal NAUSEA AND VOMITING        Objective:    VITALS: /70   Pulse 84   Temp 97.2 °F (36.2 °C) (Temporal)   Resp 18   Ht 5' 5\" (1.651 m)   Wt 125 lb (56.7 kg)   LMP 05/10/2025 (Exact Date)   SpO2 98%   BMI 20.80 kg/m²      BP Readings from Last 3 Encounters:   25 110/70   25 96/70    02/02/24 98/60     Wt Readings from Last 3 Encounters:   06/16/25 125 lb (56.7 kg)   03/12/25 119 lb (54 kg)   02/02/24 112 lb (50.8 kg)         PHYSICAL EXAM  GEN: pleasant, well-appearing in NAD  SKIN: no visible rashes, lesions, or evidence of trauma  HEENT:  no conjunctivitis or scleral icterus; mmm  PULM: breathing comfortably on room air  MSK: moving all extremities well, no weakness or joint tenderness  NEURO: CNs grossly intact, no focal weakness, alert and oriented          The following individual(s) verbally consented to be recorded using ambient AI listening technology and understand that they can each withdraw their consent to this listening technology at any point by asking the clinician to turn off or pause the recording:    Patient name: Jacki Silva, DO    06/16/25 11:11 AM           [1]   Social History  Socioeconomic History    Marital status:    Tobacco Use    Smoking status: Never    Smokeless tobacco: Never   Vaping Use    Vaping status: Never Used   Substance and Sexual Activity    Alcohol use: Never    Drug use: Never    Sexual activity: Yes     Partners: Male     Birth control/protection: I.U.D.   Other Topics Concern    Caffeine Concern Yes    Special Diet Yes     Comment: Celiac disease (fully gluten free); also limited/no dairy    Exercise Yes    Seat Belt Yes     Social Drivers of Health     Food Insecurity: No Food Insecurity (3/12/2025)    NCSS - Food Insecurity     Worried About Running Out of Food in the Last Year: No     Ran Out of Food in the Last Year: No   Transportation Needs: No Transportation Needs (3/12/2025)    NCSS - Transportation     Lack of Transportation: No   Housing Stability: Not At Risk (3/12/2025)    NCSS - Housing/Utilities     Has Housing: Yes     Worried About Losing Housing: No     Unable to Get Utilities: No

## 2025-06-16 NOTE — PATIENT INSTRUCTIONS
[facility]  Specialties:  Midwifery  Practice:  Community Health Affiliations:  [facility]  Language:  English  Clinical Interests:  Trauma informed gynecologic and reproductive healthcare; Menstrual Problems; Contraception; Holistic Medicine; Adolescent/Teen Medicine; Gynecology; Obstetrics & Gynecology; Women's Health; Oral Contraceptive/Birth Control;  Mental Health; Sexually Transmitted Diseases; LGBTQ + Care    CONGRATULATIONS!!  Your Changing Body  Almost every part of your body is affected as you adapt to pregnancy.  The uterus and cervix will begin to soften right away.   You may not look very pregnant during the first three months.   But you are likely to have some common signs of early pregnancy:  - Nausea, fatigue, frequent urination, mood swings, bloating, and nipple/breast tenderness.     Tips to Relieve Nausea  Although nausea can occur at any time of the day, it may be worse in the morning.   To help prevent nausea:  Eat small, light meals at frequent intervals.  Get up slowly. Eat a few unsalted crackers before you get out of bed.  Drink water with lemon slices.  Eat a Popsicle in your favorite flavor or suck on hard candy like peppermints.  Drink flat sodas.  Try jayda tea or jayda capsules.  If you can not tolerate food or liquids, call the office    It's Not Too Late to Start Good Habits  What matters most is protecting your baby from this moment on. If you smoke, drink alcohol, or use drugs, now is the time to stop. If you need help, let us know.    Advice for Travel  Travel by car is a good choice, as you can stop, get out, and stretch.   Bring snacks and water along.   Fasten the lap belt below your belly and be sure to wear the shoulder harness.    Do leg exercises or get out and walk for a couple of minutes every 2-3 hours of your trip.   If you are flying, you will need a letter from me for the airlines.    Unless there are complications, you  can travel by plane until 1 mo prior to your due date. You should also do leg exercises or walk about the cabin during a flight.    Intimacy  There is no reason to stop having sex while you're pregnant.   You or your partner may notice changes in desire.   1st trimester: may have less desire, due to nausea and fatigue.   2nd trimester: sex may be very enjoyable.   3rd trimester can be a challenge comfort-wise. Try different positions and see what's best for you both.    Other things:  You should be taking a prenatal vitamin to supplement certain vitamins, such as folic acid, calcium and iron which are important to your developing baby  Limit caffeine intake to 1-2 caffeinnated beverage a day (small cup of coffee = 8oz, tea, 1 can of soda = 12 oz).  Increased amounts of caffeine have been shown to increase risk of miscarriage.  Once you are past 13 weeks, you do not have to be as strict with the caffeine.  Stay hydrated throughout the day, drinking at least 64 oz of water or other beverages a day.  Do not eat undercooked or raw meats, poultry and fish.  This includes sushi.  Limit intake of fish for mercury exposure.    Avoid unpasteurized cheeses.  In many restaurants, these cheeses are pasteurized.  Make sure to ask.  If you buy it at the grocery store, make sure you are buying pasteurized.    Minimize the deli meat in your diet to less than once per week.  If you do eat it, microwave or toast it to kill off bacteria.

## 2025-06-19 ENCOUNTER — OFFICE VISIT (OUTPATIENT)
Dept: OBGYN CLINIC | Facility: CLINIC | Age: 35
End: 2025-06-19
Payer: COMMERCIAL

## 2025-06-19 VITALS
WEIGHT: 123.38 LBS | HEART RATE: 85 BPM | BODY MASS INDEX: 21 KG/M2 | DIASTOLIC BLOOD PRESSURE: 72 MMHG | SYSTOLIC BLOOD PRESSURE: 116 MMHG

## 2025-06-19 DIAGNOSIS — N92.6 MISSED MENSES: Primary | ICD-10-CM

## 2025-06-19 PROCEDURE — 3074F SYST BP LT 130 MM HG: CPT | Performed by: ADVANCED PRACTICE MIDWIFE

## 2025-06-19 PROCEDURE — 99203 OFFICE O/P NEW LOW 30 MIN: CPT | Performed by: ADVANCED PRACTICE MIDWIFE

## 2025-06-19 PROCEDURE — 3078F DIAST BP <80 MM HG: CPT | Performed by: ADVANCED PRACTICE MIDWIFE

## 2025-06-19 NOTE — PROGRESS NOTES
CHIEF COMPLAINT:  Chief Complaint   Patient presents with    Prenatal Care          HPI:  Jacki is 34 year old, female, here today for a missed menses visit.  Currently, she is feeling well. Denies 1st trimester danger signs.     Medical history significant for celiac disease    Patient's last menstrual period was 05/10/2025 (exact date).            LMP 5/10/25 --> 5.5wks --> RIYA 25  Has regular periods    /FOB: [place]  Family H/O of genetic disorders: no  Cats at home: no  Occupational hazzards: no  H/O of STIs: no  H/O of chicken pox or vaccine: chicken pox as a child  Exercise: walk, biking, rollerblading  History of MRSA or other difficult to treat infections: no  Recent Travel outside [place] no    HISTORY:  Past Medical History[1]   Past Surgical History[2]   Family History[3]   Social History: Short Social Hx on File[4]    Safe relationship/safe home environment      Medications (Active prior to today's visit):  Current Medications[5]    Allergies:  Allergies[6]    REVIEW OF SYSTEMS:  Review of Systems   All other systems reviewed and are negative.       PHYSICAL EXAM:  Vitals:    25 1324   BP: 116/72   Pulse: 85     Physical Exam  Vitals and nursing note reviewed.   Constitutional:       General: She is not in acute distress.     Appearance: Normal appearance. She is normal weight. She is not ill-appearing, toxic-appearing or diaphoretic.   Pulmonary:      Effort: Pulmonary effort is normal.   Neurological:      Mental Status: She is alert and oriented to person, place, and time.   Psychiatric:         Mood and Affect: Mood normal.         Behavior: Behavior normal.         Thought Content: Thought content normal.         Judgment: Judgment normal.          No results found for this or any previous visit (from the past 24 hours).     ASSESSMENT/PLAN:   Jacki was seen today for prenatal care.    Diagnoses and all orders for this visit:    Missed menses  -     US PREG 1ST TRIMESTER  (CPT=76801); Future         Today's UCG positive result reviewed with patient  Appropriate for CNM care   eRx prenatal vitamins  Recommended starting low dose ASA at 12-13 weeks    Pre-eclampsia Risk Assessment:   High Risk Factors (any 1 of below): none  Moderate Risk Factors (any 2 of below) nullip and AMA    Next visit: Patient to schedule Nurse Education visit, next available, and NOB for 12wga    Counseling included:  -- CNM care, philosophy, and protocols  -- Discussed importance of folic acid, calcium, vitamin D  -- Reviewed pregnancy recommendations regarding weight gain, diet/hydration, and food safety  -- Travel discussed, avoid travel to zika zones, use of support stockings with flights longer than 3 hours, movement every 2-3 hours if driving  -- Discussed exercise  -- Discussed avoidance of Jacuzzis, saunas, hot tubs and steam rooms  -- Discussed avoidance of alcohol, smoking, and minimizing caffeine  -- Warning signs reviewed. Advised to call office if occur. Safe use of [employer] for messaging  -- Reviewed genetic/chromosomal testing options for pregnancies  -- Evidence that baby ASA reduces risk of preeclampsia,  birth, and IUGR in at risk populations by about 10-20%   --Ultrasound ordered for viability  -- Schedule RN OB ED visit   -- I have spent 30 minutes total time on the day of the encounter, including: preparing to see the patient, ordering/reviewing labs, performing a medically appropriate exam, and providing care coordination. face to face counseling, chart review, orders and coordination of care    Pt verbalized understanding. All questions answered. No barriers to learning identified          [1]   Past Medical History:   Anxiety    Kyleena IUD 2023    Unintended weight loss   [2]   Past Surgical History:  Procedure Laterality Date    Other surgical history  wisdom teeth, benign mole    [place] teeth removed Bilateral    [3]   Family History  Problem Relation Age of Onset    Cancer  Father         glioblastoma    Heart Disorder Father     Other (glioblastoma) Father     Musculo-skelatal Disorder Mother     Cancer Mother         endometrial and a lymphoma    Endometrial Cancer Mother     Other (lymphoma) Mother     Depression Maternal Grandmother     Stroke Maternal Grandmother    [4]   Social History  Socioeconomic History    Marital status:    Tobacco Use    Smoking status: Never    Smokeless tobacco: Never   Vaping Use    Vaping status: Never Used   Substance and Sexual Activity    Alcohol use: Never    Drug use: Never    Sexual activity: Yes     Partners: Male   Other Topics Concern    Caffeine Concern Yes    Special Diet Yes     Comment: Celiac disease (fully gluten free); also limited/no dairy    Exercise Yes    Seat Belt Yes     Social Drivers of Health     Food Insecurity: No Food Insecurity (3/12/2025)    NCSS - Food Insecurity     Worried About Running Out of Food in the Last Year: No     Ran Out of Food in the Last Year: No   Transportation Needs: No Transportation Needs (3/12/2025)    NCSS - Transportation     Lack of Transportation: No   Housing Stability: Not At Risk (3/12/2025)    NCSS - Housing/Utilities     Has Housing: Yes     Worried About Losing Housing: No     Unable to Get Utilities: No   [5]   Current Outpatient Medications   Medication Sig Dispense Refill    prenatal vitamin with DHA 27-0.8-228 MG Oral Cap Take 1 capsule by mouth daily.      calcium carbonate 500 MG Oral Chew Tab Chew 1 tablet (500 mg total) by mouth daily.     [6]   Allergies  Allergen Reactions    Dairy Products OTHER (SEE COMMENTS)     Digestive discomfort, subdermal swelling, joint pain    Gluten Meal NAUSEA AND VOMITING

## 2025-07-09 ENCOUNTER — NURSE ONLY (OUTPATIENT)
Dept: OBGYN CLINIC | Facility: CLINIC | Age: 35
End: 2025-07-09
Payer: COMMERCIAL

## 2025-07-09 DIAGNOSIS — Z34.81 ENCOUNTER FOR SUPERVISION OF OTHER NORMAL PREGNANCY IN FIRST TRIMESTER (HCC): Primary | ICD-10-CM

## 2025-07-09 NOTE — PROGRESS NOTES
Pt is a  with EDC 24 of based on LMP 5/10/25    Med Hx- anxiety- medication and therapy    OB Hx- none     Pt here for OB RN Education visit. Education materials reviewed with pt including, but not limited to: plan of care, safe medications, guidance on nutrition, travel, food safety, when to call the MD,ect.      First trimester prenatal labs ordered for pt.     Pt counseled on the availability of genetic screenings including: cell free DNA, FTS w/US,Quad screen, MSAFP, and CF screening.  Considering [place]. Does not want to find out the gender. Is considering the NT through MFM but wants to confirm with insurance first.      Media policy for [facility] reviewed with pt.    EPDS score for today- 3    Epidural- unsure- needs more education.     Circumcision- Yes    Feeding- Breast    Transfusion-  Yes    How pt heard about the midwives office-  Through a friend who has used the midwife.     NOB-  with Mari

## 2025-07-10 ENCOUNTER — PATIENT MESSAGE (OUTPATIENT)
Dept: OBGYN CLINIC | Facility: CLINIC | Age: 35
End: 2025-07-10

## 2025-07-10 ENCOUNTER — TELEPHONE (OUTPATIENT)
Dept: OBGYN CLINIC | Facility: CLINIC | Age: 35
End: 2025-07-10

## 2025-07-10 DIAGNOSIS — Z34.01 ENCOUNTER FOR SUPERVISION OF NORMAL FIRST PREGNANCY IN FIRST TRIMESTER (HCC): Primary | ICD-10-CM

## 2025-07-10 NOTE — TELEPHONE ENCOUNTER
Patient requesting cpt codes for option 58878 ultrasound that pairs with a screening, health of the baby testing. Patient asking what the secondary CPT code is?

## 2025-07-10 NOTE — TELEPHONE ENCOUNTER
Pt verified name and .     Pt requesting CPT codes for first trimester ultrasound and nuchal translucency ultrasound. CPT codes provided. Pt requesting M referral for NT screen. Referral placed. Phone number provided for scheduling.

## 2025-07-16 ENCOUNTER — LAB ENCOUNTER (OUTPATIENT)
Dept: LAB | Facility: REFERENCE LAB | Age: 35
End: 2025-07-16
Attending: ADVANCED PRACTICE MIDWIFE
Payer: COMMERCIAL

## 2025-07-16 DIAGNOSIS — Z34.81 ENCOUNTER FOR SUPERVISION OF OTHER NORMAL PREGNANCY IN FIRST TRIMESTER (HCC): ICD-10-CM

## 2025-07-16 LAB
ANTIBODY SCREEN: NEGATIVE
BASOPHILS # BLD AUTO: 0.03 X10(3) UL (ref 0–0.2)
BASOPHILS NFR BLD AUTO: 0.5 %
DEPRECATED RDW RBC AUTO: 41.9 FL (ref 35.1–46.3)
EOSINOPHIL # BLD AUTO: 0.03 X10(3) UL (ref 0–0.7)
EOSINOPHIL NFR BLD AUTO: 0.5 %
ERYTHROCYTE [DISTWIDTH] IN BLOOD BY AUTOMATED COUNT: 12 % (ref 11–15)
EST. AVERAGE GLUCOSE BLD GHB EST-MCNC: 108 MG/DL (ref 68–126)
HBA1C MFR BLD: 5.4 % (ref ?–5.7)
HBV SURFACE AG SER-ACNC: 0.13 [IU]/L
HBV SURFACE AG SERPL QL IA: NONREACTIVE
HCT VFR BLD AUTO: 37.5 % (ref 35–48)
HCV AB SERPL QL IA: NONREACTIVE
HGB BLD-MCNC: 12.9 G/DL (ref 12–16)
IMM GRANULOCYTES # BLD AUTO: 0.02 X10(3) UL (ref 0–1)
IMM GRANULOCYTES NFR BLD: 0.3 %
LYMPHOCYTES # BLD AUTO: 0.63 X10(3) UL (ref 1–4)
LYMPHOCYTES NFR BLD AUTO: 10.9 %
MCH RBC QN AUTO: 32.5 PG (ref 26–34)
MCHC RBC AUTO-ENTMCNC: 34.4 G/DL (ref 31–37)
MCV RBC AUTO: 94.5 FL (ref 80–100)
MONOCYTES # BLD AUTO: 0.43 X10(3) UL (ref 0.1–1)
MONOCYTES NFR BLD AUTO: 7.5 %
NEUTROPHILS # BLD AUTO: 4.63 X10 (3) UL (ref 1.5–7.7)
NEUTROPHILS # BLD AUTO: 4.63 X10(3) UL (ref 1.5–7.7)
NEUTROPHILS NFR BLD AUTO: 80.3 %
PLATELET # BLD AUTO: 209 10(3)UL (ref 150–450)
RBC # BLD AUTO: 3.97 X10(6)UL (ref 3.8–5.3)
RH BLOOD TYPE: POSITIVE
RUBV IGG SER QL: POSITIVE
RUBV IGG SER-ACNC: 74 IU/ML (ref 10–?)
T PALLIDUM AB SER QL IA: NONREACTIVE
TSI SER-ACNC: 1.38 UIU/ML (ref 0.55–4.78)
WBC # BLD AUTO: 5.8 X10(3) UL (ref 4–11)

## 2025-07-16 PROCEDURE — 86787 VARICELLA-ZOSTER ANTIBODY: CPT

## 2025-07-16 PROCEDURE — 87340 HEPATITIS B SURFACE AG IA: CPT

## 2025-07-16 PROCEDURE — 83021 HEMOGLOBIN CHROMOTOGRAPHY: CPT

## 2025-07-16 PROCEDURE — 83036 HEMOGLOBIN GLYCOSYLATED A1C: CPT

## 2025-07-16 PROCEDURE — 86780 TREPONEMA PALLIDUM: CPT

## 2025-07-16 PROCEDURE — 86900 BLOOD TYPING SEROLOGIC ABO: CPT

## 2025-07-16 PROCEDURE — 86901 BLOOD TYPING SEROLOGIC RH(D): CPT

## 2025-07-16 PROCEDURE — 87389 HIV-1 AG W/HIV-1&-2 AB AG IA: CPT

## 2025-07-16 PROCEDURE — 84443 ASSAY THYROID STIM HORMONE: CPT

## 2025-07-16 PROCEDURE — 86803 HEPATITIS C AB TEST: CPT

## 2025-07-16 PROCEDURE — 83020 HEMOGLOBIN ELECTROPHORESIS: CPT

## 2025-07-16 PROCEDURE — 86850 RBC ANTIBODY SCREEN: CPT

## 2025-07-16 PROCEDURE — 85025 COMPLETE CBC W/AUTO DIFF WBC: CPT

## 2025-07-16 PROCEDURE — 36415 COLL VENOUS BLD VENIPUNCTURE: CPT

## 2025-07-16 PROCEDURE — 87086 URINE CULTURE/COLONY COUNT: CPT

## 2025-07-16 PROCEDURE — 86762 RUBELLA ANTIBODY: CPT

## 2025-07-17 ENCOUNTER — RESULTS FOLLOW-UP (OUTPATIENT)
Dept: OBGYN CLINIC | Facility: CLINIC | Age: 35
End: 2025-07-17

## 2025-07-18 LAB — VZV IGG SER IA-ACNC: 6.7 (ref 1–?)

## 2025-07-23 LAB
HGB A2 MFR BLD: 2.5 % (ref 1.5–3.5)
HGB PNL BLD ELPH: 97.5 % (ref 95.5–100)

## 2025-08-06 ENCOUNTER — INITIAL PRENATAL (OUTPATIENT)
Dept: OBGYN CLINIC | Facility: CLINIC | Age: 35
End: 2025-08-06

## 2025-08-06 ENCOUNTER — TELEPHONE (OUTPATIENT)
Dept: OBGYN CLINIC | Facility: CLINIC | Age: 35
End: 2025-08-06

## 2025-08-06 VITALS
DIASTOLIC BLOOD PRESSURE: 69 MMHG | BODY MASS INDEX: 21 KG/M2 | SYSTOLIC BLOOD PRESSURE: 109 MMHG | HEART RATE: 78 BPM | WEIGHT: 128.19 LBS

## 2025-08-06 DIAGNOSIS — Z34.92 PRENATAL CARE, SECOND TRIMESTER (HCC): Primary | ICD-10-CM

## 2025-08-06 DIAGNOSIS — Z11.3 SCREEN FOR STD (SEXUALLY TRANSMITTED DISEASE): ICD-10-CM

## 2025-08-06 DIAGNOSIS — O09.529 ANTEPARTUM MULTIGRAVIDA OF ADVANCED MATERNAL AGE (HCC): ICD-10-CM

## 2025-08-06 PROCEDURE — 3074F SYST BP LT 130 MM HG: CPT | Performed by: ADVANCED PRACTICE MIDWIFE

## 2025-08-06 PROCEDURE — 3078F DIAST BP <80 MM HG: CPT | Performed by: ADVANCED PRACTICE MIDWIFE

## 2025-08-06 RX ORDER — ASPIRIN 81 MG/1
81 TABLET, CHEWABLE ORAL DAILY
Qty: 30 TABLET | Refills: 5 | Status: SHIPPED | OUTPATIENT
Start: 2025-08-06

## 2025-08-07 ENCOUNTER — HOSPITAL ENCOUNTER (OUTPATIENT)
Dept: PERINATAL CARE | Facility: HOSPITAL | Age: 35
Discharge: HOME | End: 2025-08-07
Attending: ADVANCED PRACTICE MIDWIFE

## 2025-08-07 ENCOUNTER — HOSPITAL ENCOUNTER (OUTPATIENT)
Dept: PERINATAL CARE | Facility: HOSPITAL | Age: 35
Discharge: HOME | End: 2025-08-07
Attending: OBSTETRICS & GYNECOLOGY

## 2025-08-07 VITALS
WEIGHT: 127 LBS | DIASTOLIC BLOOD PRESSURE: 78 MMHG | SYSTOLIC BLOOD PRESSURE: 127 MMHG | BODY MASS INDEX: 21 KG/M2 | HEART RATE: 92 BPM

## 2025-08-07 DIAGNOSIS — O09.511 PRIMIGRAVIDA OF ADVANCED MATERNAL AGE IN FIRST TRIMESTER (HCC): ICD-10-CM

## 2025-08-07 DIAGNOSIS — O09.529 ANTEPARTUM MULTIGRAVIDA OF ADVANCED MATERNAL AGE (HCC): ICD-10-CM

## 2025-08-07 DIAGNOSIS — Z36.9 FIRST TRIMESTER SCREENING (HCC): Primary | ICD-10-CM

## 2025-08-07 DIAGNOSIS — Z36.9 FIRST TRIMESTER SCREENING (HCC): ICD-10-CM

## 2025-08-07 LAB
C TRACH DNA SPEC QL NAA+PROBE: NEGATIVE
N GONORRHOEA DNA SPEC QL NAA+PROBE: NEGATIVE

## 2025-08-07 PROCEDURE — 36415 COLL VENOUS BLD VENIPUNCTURE: CPT

## 2025-08-07 PROCEDURE — 76813 OB US NUCHAL MEAS 1 GEST: CPT | Performed by: OBSTETRICS & GYNECOLOGY

## 2025-08-08 ENCOUNTER — TELEPHONE (OUTPATIENT)
Dept: OBGYN CLINIC | Facility: CLINIC | Age: 35
End: 2025-08-08

## 2025-08-08 DIAGNOSIS — O09.529 ANTEPARTUM MULTIGRAVIDA OF ADVANCED MATERNAL AGE (HCC): Primary | ICD-10-CM

## 2025-08-22 ENCOUNTER — TELEPHONE (OUTPATIENT)
Dept: PERINATAL CARE | Facility: HOSPITAL | Age: 35
End: 2025-08-22

## 2025-08-27 ENCOUNTER — TELEPHONE (OUTPATIENT)
Dept: OBGYN CLINIC | Facility: CLINIC | Age: 35
End: 2025-08-27

## 2025-08-29 ENCOUNTER — TELEPHONE (OUTPATIENT)
Dept: PERINATAL CARE | Facility: HOSPITAL | Age: 35
End: 2025-08-29